# Patient Record
Sex: FEMALE | Race: BLACK OR AFRICAN AMERICAN | Employment: FULL TIME | URBAN - METROPOLITAN AREA
[De-identification: names, ages, dates, MRNs, and addresses within clinical notes are randomized per-mention and may not be internally consistent; named-entity substitution may affect disease eponyms.]

---

## 2017-08-07 ENCOUNTER — OFFICE VISIT (OUTPATIENT)
Dept: OBGYN CLINIC | Age: 57
End: 2017-08-07
Payer: COMMERCIAL

## 2017-08-07 ENCOUNTER — HOSPITAL ENCOUNTER (OUTPATIENT)
Age: 57
Setting detail: SPECIMEN
Discharge: HOME | End: 2017-08-07
Payer: COMMERCIAL

## 2017-08-07 VITALS — BODY MASS INDEX: 25.99 KG/M2 | WEIGHT: 156 LBS | HEIGHT: 65 IN

## 2017-08-07 DIAGNOSIS — Z12.31 VISIT FOR SCREENING MAMMOGRAM: ICD-10-CM

## 2017-08-07 DIAGNOSIS — Z01.419 ROUTINE GYNECOLOGICAL EXAMINATION: Primary | ICD-10-CM

## 2017-08-07 PROCEDURE — 99396 PREV VISIT EST AGE 40-64: CPT | Performed by: OBSTETRICS & GYNECOLOGY

## 2017-08-08 LAB — CYTOLOGY REPORT: NORMAL

## 2017-09-15 ENCOUNTER — HOSPITAL ENCOUNTER (OUTPATIENT)
Dept: MAMMOGRAPHY | Age: 57
Discharge: HOME | End: 2017-09-15
Payer: COMMERCIAL

## 2017-09-15 DIAGNOSIS — Z12.31 VISIT FOR SCREENING MAMMOGRAM: ICD-10-CM

## 2017-09-15 PROCEDURE — 77063 BREAST TOMOSYNTHESIS BI: CPT

## 2019-07-11 ENCOUNTER — HOSPITAL ENCOUNTER (OUTPATIENT)
Age: 59
Setting detail: SPECIMEN
Discharge: HOME | End: 2019-07-11
Payer: COMMERCIAL

## 2019-07-11 ENCOUNTER — OFFICE VISIT (OUTPATIENT)
Dept: OBGYN CLINIC | Age: 59
End: 2019-07-11
Payer: COMMERCIAL

## 2019-07-11 VITALS
WEIGHT: 157 LBS | DIASTOLIC BLOOD PRESSURE: 74 MMHG | HEIGHT: 65 IN | HEART RATE: 84 BPM | SYSTOLIC BLOOD PRESSURE: 124 MMHG | BODY MASS INDEX: 26.16 KG/M2

## 2019-07-11 DIAGNOSIS — Z12.31 VISIT FOR SCREENING MAMMOGRAM: ICD-10-CM

## 2019-07-11 DIAGNOSIS — Z01.419 ENCOUNTER FOR GYNECOLOGICAL EXAMINATION WITHOUT ABNORMAL FINDING: Primary | ICD-10-CM

## 2019-07-11 PROCEDURE — 99396 PREV VISIT EST AGE 40-64: CPT | Performed by: OBSTETRICS & GYNECOLOGY

## 2019-07-11 ASSESSMENT — PATIENT HEALTH QUESTIONNAIRE - PHQ9
1. LITTLE INTEREST OR PLEASURE IN DOING THINGS: 1
SUM OF ALL RESPONSES TO PHQ QUESTIONS 1-9: 2
2. FEELING DOWN, DEPRESSED OR HOPELESS: 1
SUM OF ALL RESPONSES TO PHQ9 QUESTIONS 1 & 2: 2
SUM OF ALL RESPONSES TO PHQ QUESTIONS 1-9: 2

## 2019-07-16 LAB — CYTOLOGY REPORT: NORMAL

## 2019-09-06 ENCOUNTER — HOSPITAL ENCOUNTER (OUTPATIENT)
Dept: MAMMOGRAPHY | Age: 59
Discharge: HOME | End: 2019-09-08
Payer: COMMERCIAL

## 2019-09-06 DIAGNOSIS — Z12.31 VISIT FOR SCREENING MAMMOGRAM: ICD-10-CM

## 2019-09-06 PROCEDURE — 77063 BREAST TOMOSYNTHESIS BI: CPT

## 2020-07-28 ENCOUNTER — OFFICE VISIT (OUTPATIENT)
Dept: OBGYN CLINIC | Age: 60
End: 2020-07-28
Payer: COMMERCIAL

## 2020-07-28 ENCOUNTER — HOSPITAL ENCOUNTER (OUTPATIENT)
Age: 60
Setting detail: SPECIMEN
Discharge: HOME | End: 2020-07-28
Payer: COMMERCIAL

## 2020-07-28 VITALS
HEIGHT: 65 IN | DIASTOLIC BLOOD PRESSURE: 70 MMHG | BODY MASS INDEX: 26.16 KG/M2 | SYSTOLIC BLOOD PRESSURE: 118 MMHG | WEIGHT: 157 LBS

## 2020-07-28 PROCEDURE — 99396 PREV VISIT EST AGE 40-64: CPT | Performed by: OBSTETRICS & GYNECOLOGY

## 2020-07-28 ASSESSMENT — PATIENT HEALTH QUESTIONNAIRE - PHQ9
SUM OF ALL RESPONSES TO PHQ QUESTIONS 1-9: 0
SUM OF ALL RESPONSES TO PHQ9 QUESTIONS 1 & 2: 0
2. FEELING DOWN, DEPRESSED OR HOPELESS: 0
1. LITTLE INTEREST OR PLEASURE IN DOING THINGS: 0
SUM OF ALL RESPONSES TO PHQ QUESTIONS 1-9: 0

## 2020-07-28 NOTE — PATIENT INSTRUCTIONS
Please get your mammogram done as scheduled. Return to the office in 1 year or as needed. Have a safe summer and a healthy year!

## 2020-07-28 NOTE — PROGRESS NOTES
DATE OF VISIT:  20        History and Physical    Og Gómez    :  1960  CHIEF COMPLAINT:    Chief Complaint   Patient presents with    Annual Exam     Here for annual  Last pap 19 neg  Last mammogram 19 neg                    HPI :   Og Gómez is a 63 y.o. femaleGRAVIDA 3 PARA 3003    Og Gómez returns today for her annual exam.  She is here today with her Sister Sonia White who helps caring for her  with Alzheimer's. She states that she is doing very well. She is having regular bowel movements without constipation or diarrhea. She denies any involuntary loss of urine. She is otherwise without any significant complaints today. Jacqueline Ramesh is still working with [employer] in  [facility]. Her PCP is now Dr. Yonis Guardado  _____________________________________________________________________  Past Medical History:   Diagnosis Date    Bulging lumbar disc     Migraine                                                                    Past Surgical History:   Procedure Laterality Date    COLPOSCOPY      HYSTERECTOMY      HONEY    LAPAROSCOPY      TUBAL LIGATION       Family History   Problem Relation Age of Onset    Diabetes Brother     Cirrhosis Brother     Hypertension Brother     Lupus Sister     Cancer Sister 48        breast cancer    Cancer Brother 48        prostate    Heart Disease Father     Cancer Father         colon and prostate    Hypertension Mother     Heart Disease Mother      Social History     Tobacco Use   Smoking Status Former Smoker   Smokeless Tobacco Never Used   Tobacco Comment    quit 30 yrs ago     Social History     Substance and Sexual Activity   Alcohol Use No     Current Outpatient Medications   Medication Sig Dispense Refill    vitamin D (CHOLECALCIFEROL) 1000 UNIT TABS tablet Take 1,000 Units by mouth daily      Multiple Vitamins-Minerals (ALIVE WOMENS 50+) TABS Take  by mouth.       propranolol (INDERAL) 10 MG tablet Take 10 mg by mouth. 1 daily       No current facility-administered medications for this visit. Allergies: Allergies   Allergen Reactions    Pcn [Penicillins] Hives       Gynecologic History:  No LMP recorded. Patient has had a hysterectomy. Sexually Active: No  STD History: No  Abnormal Pap History no  Birth Control: Hysterectomy    OB History    Para Term  AB Living   3 3 0 0 0 0   SAB TAB Ectopic Molar Multiple Live Births   0 0 0 0 0 0     ______________________________________________________________________  REVIEW OF SYSTEMS:        Constitutional:  Unexpected weight change no  Neurological:  Frequent headaches  no  Ophthalmic:  Recent visual changes no  ENT:   Difficulty swallowing  no  Breast:              Masses   no     Respiratory:  Shortness of breath  no    Cardiovascular: Chest pain   no     Gastrointestinal: Chronic diarrhea/constipation no   Urogenital:  Urinary incontinence  no                                         Heavy/irregular periods           no                                      Vaginal discharge                   no  Hematological: Bruises easy   no     Endocrine:  Nipple Discharge  no     Hot/Cold Intolerance  no   Psychological:  Mood and affect were wnl yes                                                                                                                                           Physical Exam:    Vitals:    20 1442   BP: 118/70   Site: Right Upper Arm   Position: Sitting   Cuff Size: Medium Adult   Weight: 157 lb (71.2 kg)   Height: 5' 5\" (1.651 m)       General Appearance:  She does not appear to be in any distress. This  is a well developed, well nourished, well groomed female. Neurological:  The patient is alert and oriented to time, place, person, and situation without any noted sensory motor deficits. Skin:  A brief inspection of the skin revealed no rashes or lesions. Neck:  The neck was supple.   There is no tracheal deviation,

## 2020-07-31 LAB — CYTOLOGY REPORT: NORMAL

## 2020-11-14 ENCOUNTER — HOSPITAL ENCOUNTER (OUTPATIENT)
Dept: MAMMOGRAPHY | Age: 60
Discharge: HOME | End: 2020-11-16
Payer: COMMERCIAL

## 2020-11-14 PROCEDURE — 77063 BREAST TOMOSYNTHESIS BI: CPT

## 2022-05-18 ENCOUNTER — HOSPITAL ENCOUNTER (OUTPATIENT)
Dept: MAMMOGRAPHY | Age: 62
Discharge: HOME | End: 2022-05-20
Payer: COMMERCIAL

## 2022-05-18 DIAGNOSIS — Z12.31 ENCOUNTER FOR SCREENING MAMMOGRAM FOR BREAST CANCER: ICD-10-CM

## 2022-05-18 PROCEDURE — 77063 BREAST TOMOSYNTHESIS BI: CPT

## 2023-03-27 ENCOUNTER — HOSPITAL ENCOUNTER (EMERGENCY)
Facility: CLINIC | Age: 63
Discharge: HOME | End: 2023-03-27
Attending: EMERGENCY MEDICINE
Payer: COMMERCIAL

## 2023-03-27 VITALS
RESPIRATION RATE: 17 BRPM | HEART RATE: 77 BPM | TEMPERATURE: 97.9 F | OXYGEN SATURATION: 98 % | HEIGHT: 65 IN | BODY MASS INDEX: 26.66 KG/M2 | WEIGHT: 160 LBS

## 2023-03-27 DIAGNOSIS — J06.9 VIRAL URI: Primary | ICD-10-CM

## 2023-03-27 PROCEDURE — 99283 EMERGENCY DEPT VISIT LOW MDM: CPT

## 2023-03-27 RX ORDER — AZITHROMYCIN 250 MG/1
TABLET, FILM COATED ORAL
Qty: 1 PACKET | Refills: 0 | Status: SHIPPED | OUTPATIENT
Start: 2023-03-27

## 2023-03-27 RX ORDER — PREDNISONE 10 MG/1
10 TABLET ORAL SEE ADMIN INSTRUCTIONS
Qty: 17 TABLET | Refills: 0 | Status: SHIPPED | OUTPATIENT
Start: 2023-03-27 | End: 2023-04-02

## 2023-03-27 ASSESSMENT — ENCOUNTER SYMPTOMS
SORE THROAT: 1
COUGH: 1
SHORTNESS OF BREATH: 0
DIARRHEA: 0
VOMITING: 0

## 2023-03-27 ASSESSMENT — PAIN - FUNCTIONAL ASSESSMENT: PAIN_FUNCTIONAL_ASSESSMENT: 0-10

## 2023-03-27 ASSESSMENT — PAIN SCALES - GENERAL: PAINLEVEL_OUTOF10: 7

## 2023-03-27 NOTE — ED PROVIDER NOTES
PROPRANOLOL (INDERAL) 10 MG TABLET    Take 10 mg by mouth. 1 daily    VITAMIN D (CHOLECALCIFEROL) 1000 UNIT TABS TABLET    Take 1,000 Units by mouth daily       ALLERGIES     Pcn [penicillins]    FAMILY HISTORY       Family History   Problem Relation Age of Onset    Diabetes Brother     Cirrhosis Brother     Hypertension Brother     Lupus Sister     Cancer Sister 48        breast cancer    Cancer Brother 48        prostate    Heart Disease Father     Cancer Father         colon and prostate    Hypertension Mother     Heart Disease Mother           SOCIAL HISTORY       Social History     Socioeconomic History    Marital status:    Tobacco Use    Smoking status: Former    Smokeless tobacco: Never    Tobacco comments:     quit 30 yrs ago   Substance and Sexual Activity    Alcohol use: No    Drug use: No       SCREENINGS    [place] Coma Scale  Eye Opening: Spontaneous  Best Verbal Response: Oriented  Best Motor Response: Obeys commands  [place] Coma Scale Score: 15        PHYSICAL EXAM    (up to 7 for level 4, 8 or more for level 5)     ED Triage Vitals [03/27/23 1605]   BP Temp Temp src Heart Rate Resp SpO2 Height Weight   -- 97.9 °F (36.6 °C) -- 77 17 98 % 5' 5\" (1.651 m) 160 lb (72.6 kg)       Physical Exam  Vitals and nursing note reviewed. Constitutional:       General: She is not in acute distress. Appearance: Normal appearance. She is not ill-appearing or toxic-appearing. HENT:      Head: Normocephalic and atraumatic. Nose: Nose normal. No congestion. Mouth/Throat:      Mouth: Mucous membranes are moist.   Eyes:      General:         Right eye: No discharge. Left eye: No discharge. Conjunctiva/sclera: Conjunctivae normal.   Cardiovascular:      Rate and Rhythm: Normal rate and regular rhythm. Pulses: Normal pulses. Heart sounds: Normal heart sounds. No murmur heard. Pulmonary:      Effort: Pulmonary effort is normal. No respiratory distress.       Breath sounds:

## 2023-10-03 ENCOUNTER — HOSPITAL ENCOUNTER (OUTPATIENT)
Age: 63
Setting detail: SPECIMEN
Discharge: HOME | End: 2023-10-03

## 2023-10-03 ENCOUNTER — OFFICE VISIT (OUTPATIENT)
Dept: OBGYN CLINIC | Age: 63
End: 2023-10-03
Payer: COMMERCIAL

## 2023-10-03 VITALS — SYSTOLIC BLOOD PRESSURE: 122 MMHG | WEIGHT: 160 LBS | BODY MASS INDEX: 26.63 KG/M2 | DIASTOLIC BLOOD PRESSURE: 82 MMHG

## 2023-10-03 DIAGNOSIS — Z01.419 ENCOUNTER FOR GYNECOLOGICAL EXAMINATION WITHOUT ABNORMAL FINDING: Primary | ICD-10-CM

## 2023-10-03 DIAGNOSIS — Z12.31 VISIT FOR SCREENING MAMMOGRAM: ICD-10-CM

## 2023-10-03 PROCEDURE — 99386 PREV VISIT NEW AGE 40-64: CPT | Performed by: OBSTETRICS & GYNECOLOGY

## 2023-10-03 NOTE — PATIENT INSTRUCTIONS
We will contact you with the results of today's Pap smear and your mammogram.  Plan on returning to the office in 1 year or as needed. Stay safe, healthy and have a fantastic year!

## 2023-10-03 NOTE — PROGRESS NOTES
[facility]  [facility] Sherry [place] [address] [place] [address] [address] [address]      DATE OF VISIT:  10/3/23        History and Physical    Neena Tatum    :  1960  CHIEF COMPLAINT:    Chief Complaint   Patient presents with    Established New Doctor     New patient here for annual last pap 20 neg   last mammogram  having mammogram 10/04/23                    HPI :   Neena Tatum is a 58 y.o. femaleGRAVIDA 3 PARA 3003  PCP: Lindsay Krishna MD    Neena Tatum returns today for her annual exam.  She is the sister of Guerita Monahan and has been lost to follow-up for 3 years. She is still in the practice of Dr. Nikky Cohen who has retired recently. She has her mammogram scheduled for tomorrow and will see her new PCP in the near future. She is up-to-date with colonoscopies because of her family history. She is having regular bowel movements without constipation or diarrhea. She denies any involuntary loss of urine. She is otherwise without any significant complaints today. 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   Her  is now in in-house hospice due to Alzheimer's.  _____________________________________________________________________  Past Medical History:   Diagnosis Date    Bulging lumbar disc     Migraine                                                                    Past Surgical History:   Procedure Laterality Date    COLPOSCOPY      HYSTERECTOMY (CERVIX STATUS UNKNOWN)      HONEY    LAPAROSCOPY      TUBAL LIGATION       Family History   Problem Relation Age of Onset    Diabetes Brother     Cirrhosis Brother     Hypertension Brother     Lupus Sister     Cancer Sister 48        breast cancer    Cancer Brother 48        prostate    Heart Disease Father     Cancer Father         colon and prostate    Hypertension Mother     Heart Disease Mother      Social History

## 2023-10-04 ENCOUNTER — HOSPITAL ENCOUNTER (OUTPATIENT)
Dept: MAMMOGRAPHY | Age: 63
Discharge: HOME | End: 2023-10-06
Payer: COMMERCIAL

## 2023-10-04 DIAGNOSIS — Z12.31 VISIT FOR SCREENING MAMMOGRAM: ICD-10-CM

## 2023-10-04 PROCEDURE — 77063 BREAST TOMOSYNTHESIS BI: CPT

## 2023-10-11 LAB — CYTOLOGY REPORT: NORMAL

## 2024-10-17 ENCOUNTER — HOSPITAL ENCOUNTER (OUTPATIENT)
Dept: MAMMOGRAPHY | Age: 64
Discharge: HOME | End: 2024-10-19
Payer: COMMERCIAL

## 2024-10-17 VITALS — WEIGHT: 160 LBS | BODY MASS INDEX: 26.66 KG/M2 | HEIGHT: 65 IN

## 2024-10-17 DIAGNOSIS — Z12.31 VISIT FOR SCREENING MAMMOGRAM: ICD-10-CM

## 2024-10-17 PROCEDURE — 77063 BREAST TOMOSYNTHESIS BI: CPT

## 2024-10-21 NOTE — PATIENT INSTRUCTIONS
Please remember to get your bone density x-ray done at your convenience.  We will let you know that result.  Return to the office in 1 year.  Have a wonderful year!

## 2024-10-21 NOTE — PROGRESS NOTES
[facility], [facility]  [facility]  [address]  [address]  [facility] [address]      DATE OF VISIT: 10/22/24        History and Physical    Nancy Valentine    :  1960  CHIEF COMPLAINT:   Chief Complaint   Patient presents with    Annual Exam     Here for annual last pap 10/03/23 neg last mammogram 10/17/24 neg                         HPI :   Nancy Valentine is a 64y.o. femaleGRAVIDA 3 PARA 3003  PCP: Marlo Santacruz MD     Nancy Valentine returns today for her annual exam.  She is the sister of Harmony Lou and was a former patient of Dr. Santacruz who has retired.  She has now established care with Dr. Pineda.  She has a history of osteopenia and he would like us to order a DEXA scan.She had her mammogram in October and it was negative.  She is up-to-date with colonoscopies because of her family history.  She is having regular bowel movements without constipation or diarrhea.  She denies any involuntary loss of urine.  She is otherwise without any significant complaints today.  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  She is planning on retiring in ?  Her  unfortunately passed away almost 1 year ago due to Alzheimer's.  She states that she is doing fairly well and has a lot of support from her sisters and her twin brother.  _____________________________________________________________________  Past Medical History:   Diagnosis Date    Bulging lumbar disc     Migraine                                                                    Past Surgical History:   Procedure Laterality Date    COLPOSCOPY      HYSTERECTOMY (CERVIX STATUS UNKNOWN)      HONEY    LAPAROSCOPY      TUBAL LIGATION       Family History   Problem Relation Age of Onset    Hypertension Mother     Heart Disease Mother     Heart Disease Father     Cancer Father         colon and prostate    Breast Cancer Sister 51

## 2024-10-22 ENCOUNTER — OFFICE VISIT (OUTPATIENT)
Dept: OBGYN CLINIC | Age: 64
End: 2024-10-22
Payer: COMMERCIAL

## 2024-10-22 VITALS — WEIGHT: 161 LBS | DIASTOLIC BLOOD PRESSURE: 82 MMHG | SYSTOLIC BLOOD PRESSURE: 122 MMHG | BODY MASS INDEX: 26.79 KG/M2

## 2024-10-22 DIAGNOSIS — M85.80 OSTEOPENIA, UNSPECIFIED LOCATION: ICD-10-CM

## 2024-10-22 DIAGNOSIS — Z01.419 ENCOUNTER FOR GYNECOLOGICAL EXAMINATION WITHOUT ABNORMAL FINDING: Primary | ICD-10-CM

## 2024-10-22 DIAGNOSIS — Z12.31 VISIT FOR SCREENING MAMMOGRAM: ICD-10-CM

## 2024-10-22 DIAGNOSIS — Z13.820 ENCOUNTER FOR SCREENING FOR OSTEOPOROSIS: ICD-10-CM

## 2024-10-22 PROCEDURE — 99397 PER PM REEVAL EST PAT 65+ YR: CPT | Performed by: OBSTETRICS & GYNECOLOGY

## 2024-10-22 PROCEDURE — 99459 PELVIC EXAMINATION: CPT | Performed by: OBSTETRICS & GYNECOLOGY

## 2024-10-22 ASSESSMENT — PATIENT HEALTH QUESTIONNAIRE - PHQ9
SUM OF ALL RESPONSES TO PHQ QUESTIONS 1-9: 2
SUM OF ALL RESPONSES TO PHQ QUESTIONS 1-9: 2
1. LITTLE INTEREST OR PLEASURE IN DOING THINGS: SEVERAL DAYS
SUM OF ALL RESPONSES TO PHQ QUESTIONS 1-9: 2
SUM OF ALL RESPONSES TO PHQ QUESTIONS 1-9: 2
SUM OF ALL RESPONSES TO PHQ9 QUESTIONS 1 & 2: 2
2. FEELING DOWN, DEPRESSED OR HOPELESS: SEVERAL DAYS

## 2024-11-09 ENCOUNTER — HOSPITAL ENCOUNTER (OUTPATIENT)
Dept: MAMMOGRAPHY | Age: 64
Discharge: HOME | End: 2024-11-11
Attending: OBSTETRICS & GYNECOLOGY
Payer: COMMERCIAL

## 2024-11-09 DIAGNOSIS — Z13.820 ENCOUNTER FOR SCREENING FOR OSTEOPOROSIS: ICD-10-CM

## 2024-11-09 PROCEDURE — 77080 DXA BONE DENSITY AXIAL: CPT

## 2025-02-13 ENCOUNTER — HOSPITAL ENCOUNTER (EMERGENCY)
Facility: CLINIC | Age: 65
Discharge: ANOTHER HOSPITAL | End: 2025-02-14
Attending: STUDENT IN AN ORGANIZED HEALTH CARE EDUCATION/TRAINING PROGRAM
Payer: COMMERCIAL

## 2025-02-13 ENCOUNTER — APPOINTMENT (OUTPATIENT)
Dept: GENERAL RADIOLOGY | Facility: CLINIC | Age: 65
End: 2025-02-13
Payer: COMMERCIAL

## 2025-02-13 DIAGNOSIS — R42 LIGHTHEADEDNESS: ICD-10-CM

## 2025-02-13 DIAGNOSIS — I10 ESSENTIAL HYPERTENSION: ICD-10-CM

## 2025-02-13 DIAGNOSIS — R00.2 PALPITATIONS: ICD-10-CM

## 2025-02-13 DIAGNOSIS — R79.89 ELEVATED TROPONIN: Primary | ICD-10-CM

## 2025-02-13 LAB
ALBUMIN SERPL-MCNC: 4 G/DL (ref 3.5–5.2)
ALBUMIN/GLOB SERPL: 1.5 {RATIO}
ALP SERPL-CCNC: 78 U/L (ref 35–104)
ALT SERPL-CCNC: 31 U/L (ref 10–35)
ANION GAP SERPL CALCULATED.3IONS-SCNC: 11 MMOL/L (ref 9–16)
AST SERPL-CCNC: 35 U/L (ref 10–35)
BACTERIA URNS QL MICRO: NORMAL
BASOPHILS # BLD: 0 K/UL (ref 0–0.2)
BASOPHILS NFR BLD: 1 % (ref 0–2)
BILIRUB SERPL-MCNC: 0.4 MG/DL (ref 0–1.2)
BILIRUB UR QL STRIP: NEGATIVE
BNP SERPL-MCNC: 101 PG/ML (ref 0–300)
BUN SERPL-MCNC: 15 MG/DL (ref 8–23)
CALCIUM SERPL-MCNC: 9.4 MG/DL (ref 8.6–10.4)
CHLORIDE SERPL-SCNC: 104 MMOL/L (ref 98–107)
CLARITY UR: CLEAR
CO2 SERPL-SCNC: 26 MMOL/L (ref 20–31)
COLOR UR: YELLOW
CREAT SERPL-MCNC: 0.9 MG/DL (ref 0.5–0.9)
EOSINOPHIL # BLD: 0.1 K/UL (ref 0–0.4)
EOSINOPHILS RELATIVE PERCENT: 2 % (ref 1–4)
EPI CELLS #/AREA URNS HPF: NORMAL /HPF (ref 0–5)
ERYTHROCYTE [DISTWIDTH] IN BLOOD BY AUTOMATED COUNT: 15.3 % (ref 12.5–15.4)
FLUAV AG SPEC QL: NEGATIVE
FLUBV AG SPEC QL: NEGATIVE
GFR, ESTIMATED: 71 ML/MIN/1.73M2
GLUCOSE SERPL-MCNC: 129 MG/DL (ref 74–99)
GLUCOSE UR STRIP-MCNC: NEGATIVE MG/DL
HCT VFR BLD AUTO: 39.7 % (ref 36–46)
HGB BLD-MCNC: 13.2 G/DL (ref 12–16)
HGB UR QL STRIP.AUTO: NEGATIVE
KETONES UR STRIP-MCNC: NEGATIVE MG/DL
LEUKOCYTE ESTERASE UR QL STRIP: NEGATIVE
LYMPHOCYTES NFR BLD: 2.5 K/UL (ref 1–4.8)
LYMPHOCYTES RELATIVE PERCENT: 43 % (ref 24–44)
MAGNESIUM SERPL-MCNC: 2.3 MG/DL (ref 1.6–2.4)
MCH RBC QN AUTO: 29 PG (ref 26–34)
MCHC RBC AUTO-ENTMCNC: 33.2 G/DL (ref 31–37)
MCV RBC AUTO: 87.5 FL (ref 80–100)
MONOCYTES NFR BLD: 0.6 K/UL (ref 0.1–1.2)
MONOCYTES NFR BLD: 11 % (ref 2–11)
NEUTROPHILS NFR BLD: 43 % (ref 36–66)
NEUTS SEG NFR BLD: 2.4 K/UL (ref 1.8–7.7)
NITRITE UR QL STRIP: NEGATIVE
PH UR STRIP: 7 [PH] (ref 5–8)
PLATELET # BLD AUTO: 188 K/UL (ref 140–450)
PMV BLD AUTO: 9.7 FL (ref 6–12)
POTASSIUM SERPL-SCNC: 4.4 MMOL/L (ref 3.7–5.3)
PROT SERPL-MCNC: 6.6 G/DL (ref 6.6–8.7)
PROT UR STRIP-MCNC: NEGATIVE MG/DL
RBC # BLD AUTO: 4.54 M/UL (ref 4–5.2)
RBC #/AREA URNS HPF: NORMAL /HPF (ref 0–2)
SARS-COV-2 RDRP RESP QL NAA+PROBE: NOT DETECTED
SODIUM SERPL-SCNC: 141 MMOL/L (ref 136–145)
SP GR UR STRIP: 1.01 (ref 1–1.03)
SPECIMEN DESCRIPTION: NORMAL
TROPONIN I SERPL HS-MCNC: 52 NG/L (ref 0–14)
UROBILINOGEN UR STRIP-ACNC: NORMAL EU/DL (ref 0–1)
WBC #/AREA URNS HPF: NORMAL /HPF (ref 0–5)
WBC OTHER # BLD: 5.7 K/UL (ref 3.5–11)

## 2025-02-13 PROCEDURE — 84484 ASSAY OF TROPONIN QUANT: CPT

## 2025-02-13 PROCEDURE — 93005 ELECTROCARDIOGRAM TRACING: CPT | Performed by: STUDENT IN AN ORGANIZED HEALTH CARE EDUCATION/TRAINING PROGRAM

## 2025-02-13 PROCEDURE — 85025 COMPLETE CBC W/AUTO DIFF WBC: CPT

## 2025-02-13 PROCEDURE — 87635 SARS-COV-2 COVID-19 AMP PRB: CPT

## 2025-02-13 PROCEDURE — 2580000003 HC RX 258: Performed by: STUDENT IN AN ORGANIZED HEALTH CARE EDUCATION/TRAINING PROGRAM

## 2025-02-13 PROCEDURE — 6370000000 HC RX 637 (ALT 250 FOR IP): Performed by: STUDENT IN AN ORGANIZED HEALTH CARE EDUCATION/TRAINING PROGRAM

## 2025-02-13 PROCEDURE — 96374 THER/PROPH/DIAG INJ IV PUSH: CPT

## 2025-02-13 PROCEDURE — 36415 COLL VENOUS BLD VENIPUNCTURE: CPT

## 2025-02-13 PROCEDURE — 83880 ASSAY OF NATRIURETIC PEPTIDE: CPT

## 2025-02-13 PROCEDURE — 99285 EMERGENCY DEPT VISIT HI MDM: CPT

## 2025-02-13 PROCEDURE — 80053 COMPREHEN METABOLIC PANEL: CPT

## 2025-02-13 PROCEDURE — 71045 X-RAY EXAM CHEST 1 VIEW: CPT

## 2025-02-13 PROCEDURE — 6360000002 HC RX W HCPCS: Performed by: STUDENT IN AN ORGANIZED HEALTH CARE EDUCATION/TRAINING PROGRAM

## 2025-02-13 PROCEDURE — 81001 URINALYSIS AUTO W/SCOPE: CPT

## 2025-02-13 PROCEDURE — 87804 INFLUENZA ASSAY W/OPTIC: CPT

## 2025-02-13 PROCEDURE — 83735 ASSAY OF MAGNESIUM: CPT

## 2025-02-13 RX ORDER — CETIRIZINE HYDROCHLORIDE 10 MG/1
10 TABLET ORAL DAILY
COMMUNITY

## 2025-02-13 RX ORDER — HYDRALAZINE HYDROCHLORIDE 20 MG/ML
10 INJECTION INTRAMUSCULAR; INTRAVENOUS ONCE
Status: COMPLETED | OUTPATIENT
Start: 2025-02-13 | End: 2025-02-13

## 2025-02-13 RX ORDER — ASPIRIN 81 MG/1
324 TABLET, CHEWABLE ORAL ONCE
Status: COMPLETED | OUTPATIENT
Start: 2025-02-13 | End: 2025-02-13

## 2025-02-13 RX ORDER — FLUTICASONE PROPIONATE 50 MCG
1 SPRAY, SUSPENSION (ML) NASAL DAILY PRN
COMMUNITY

## 2025-02-13 RX ORDER — 0.9 % SODIUM CHLORIDE 0.9 %
500 INTRAVENOUS SOLUTION INTRAVENOUS ONCE
Status: COMPLETED | OUTPATIENT
Start: 2025-02-13 | End: 2025-02-13

## 2025-02-13 RX ADMIN — ASPIRIN 81 MG CHEWABLE TABLET 324 MG: 81 TABLET CHEWABLE at 22:40

## 2025-02-13 RX ADMIN — SODIUM CHLORIDE 500 ML: 9 INJECTION, SOLUTION INTRAVENOUS at 22:46

## 2025-02-13 RX ADMIN — HYDRALAZINE HYDROCHLORIDE 10 MG: 20 INJECTION INTRAMUSCULAR; INTRAVENOUS at 23:31

## 2025-02-13 ASSESSMENT — PAIN - FUNCTIONAL ASSESSMENT
PAIN_FUNCTIONAL_ASSESSMENT: NONE - DENIES PAIN

## 2025-02-14 VITALS
DIASTOLIC BLOOD PRESSURE: 84 MMHG | WEIGHT: 160 LBS | OXYGEN SATURATION: 100 % | HEIGHT: 64 IN | BODY MASS INDEX: 27.31 KG/M2 | RESPIRATION RATE: 15 BRPM | SYSTOLIC BLOOD PRESSURE: 145 MMHG | HEART RATE: 70 BPM | TEMPERATURE: 97.4 F

## 2025-02-14 LAB
EKG ATRIAL RATE: 74 BPM
EKG ATRIAL RATE: 81 BPM
EKG P AXIS: 52 DEGREES
EKG P AXIS: 65 DEGREES
EKG P-R INTERVAL: 138 MS
EKG P-R INTERVAL: 140 MS
EKG Q-T INTERVAL: 352 MS
EKG Q-T INTERVAL: 384 MS
EKG QRS DURATION: 64 MS
EKG QRS DURATION: 66 MS
EKG QTC CALCULATION (BAZETT): 390 MS
EKG QTC CALCULATION (BAZETT): 446 MS
EKG R AXIS: -2 DEGREES
EKG R AXIS: -5 DEGREES
EKG T AXIS: -8 DEGREES
EKG T AXIS: 0 DEGREES
EKG VENTRICULAR RATE: 74 BPM
EKG VENTRICULAR RATE: 81 BPM
TROPONIN I SERPL HS-MCNC: 45 NG/L (ref 0–14)

## 2025-02-14 PROCEDURE — 96375 TX/PRO/DX INJ NEW DRUG ADDON: CPT

## 2025-02-14 PROCEDURE — 84484 ASSAY OF TROPONIN QUANT: CPT

## 2025-02-14 PROCEDURE — 93005 ELECTROCARDIOGRAM TRACING: CPT | Performed by: STUDENT IN AN ORGANIZED HEALTH CARE EDUCATION/TRAINING PROGRAM

## 2025-02-14 PROCEDURE — 6360000002 HC RX W HCPCS: Performed by: STUDENT IN AN ORGANIZED HEALTH CARE EDUCATION/TRAINING PROGRAM

## 2025-02-14 RX ORDER — LABETALOL HYDROCHLORIDE 5 MG/ML
10 INJECTION, SOLUTION INTRAVENOUS ONCE
Status: COMPLETED | OUTPATIENT
Start: 2025-02-14 | End: 2025-02-14

## 2025-02-14 RX ADMIN — LABETALOL HYDROCHLORIDE 10 MG: 5 INJECTION, SOLUTION INTRAVENOUS at 01:59

## 2025-02-14 ASSESSMENT — PAIN - FUNCTIONAL ASSESSMENT
PAIN_FUNCTIONAL_ASSESSMENT: NONE - DENIES PAIN

## 2025-02-14 NOTE — ED PROVIDER NOTES
[facility]  Emergency Department Encounter  [facility]       Pt Name:Nancy Valentine  MRN: [id number]  Birthdate 1960  Date of evaluation: 2/14/25  PCP:  Ameya Pineda MD      CHIEF COMPLAINT       Chief Complaint   Patient presents with    Palpitations     X1 week    Lightheadedness     X1 day       HISTORY OF PRESENT ILLNESS     Nancy Valentine is a 64 y.o. female who presents via personal vehicle with reports of palpitations.  Patient has had palpitations in the past.  She does take propranolol.  She follows with cardiology.  She has been compliant with her propranolol.  She is also being treated for hypertension with propranolol.  She reports continued and more frequent palpitations over the past 1 week.  She has not experienced palpitations for this length of time in the past.  She denies chest pain.  She denies shortness of breath.  She denies abdominal pain.  She denies nausea.  She denies vomiting.  She has had no chest trauma.  She is not anticoagulated.  She does follow with cardiology.  She reports that she has had a stress test within the past 1 year she believes.  She denies fevers.  She denies chills.  She denies cough or congestion.  Today we changed is she has been lightheaded all day, she has been trying to increase her water intake and but this has not improved her lightheadedness or palpitations and therefore she came in for evaluation.    PAST MEDICAL / SURGICAL / SOCIAL / FAMILY HISTORY      has a past medical history of Bulging lumbar disc and Migraine.       has a past surgical history that includes Hysterectomy (2002); Colposcopy; laparoscopy; and Tubal ligation.      Social History     Socioeconomic History    Marital status:      Spouse name: Not on file    Number of children: Not on file    Years of education: Not on file    Highest education level: Not on file   Occupational History    Not on file   Tobacco Use    Smoking  words are mis-transcribed.)                      Tanesha Hadley,   02/14/25 0211       Tanesha Hadley,   02/14/25 0545

## 2025-02-14 NOTE — ED NOTES
Pt presents to the ED via private auto. Pt states she has been experiencing palpitations off and on x1 week. Today she began to experience lightheadedness along with palpitations.